# Patient Record
Sex: FEMALE | Race: WHITE | Employment: FULL TIME | URBAN - NONMETROPOLITAN AREA
[De-identification: names, ages, dates, MRNs, and addresses within clinical notes are randomized per-mention and may not be internally consistent; named-entity substitution may affect disease eponyms.]

---

## 2022-01-21 ENCOUNTER — OFFICE VISIT (OUTPATIENT)
Dept: INTERNAL MEDICINE CLINIC | Age: 27
End: 2022-01-21
Payer: COMMERCIAL

## 2022-01-21 VITALS
RESPIRATION RATE: 18 BRPM | DIASTOLIC BLOOD PRESSURE: 78 MMHG | OXYGEN SATURATION: 98 % | SYSTOLIC BLOOD PRESSURE: 120 MMHG | HEIGHT: 69 IN | HEART RATE: 76 BPM

## 2022-01-21 DIAGNOSIS — E07.9 THYROID DISORDER: ICD-10-CM

## 2022-01-21 DIAGNOSIS — F32.A ANXIETY AND DEPRESSION: Primary | ICD-10-CM

## 2022-01-21 DIAGNOSIS — F41.9 ANXIETY AND DEPRESSION: Primary | ICD-10-CM

## 2022-01-21 DIAGNOSIS — E78.5 DYSLIPIDEMIA: ICD-10-CM

## 2022-01-21 DIAGNOSIS — L20.82 FLEXURAL ECZEMA: ICD-10-CM

## 2022-01-21 DIAGNOSIS — E55.9 VITAMIN D DEFICIENCY: ICD-10-CM

## 2022-01-21 PROCEDURE — 99204 OFFICE O/P NEW MOD 45 MIN: CPT | Performed by: INTERNAL MEDICINE

## 2022-01-21 RX ORDER — SERTRALINE HYDROCHLORIDE 25 MG/1
25 TABLET, FILM COATED ORAL DAILY
Qty: 30 TABLET | Refills: 0 | Status: SHIPPED | OUTPATIENT
Start: 2022-01-21 | End: 2022-02-22

## 2022-01-21 SDOH — ECONOMIC STABILITY: FOOD INSECURITY: WITHIN THE PAST 12 MONTHS, THE FOOD YOU BOUGHT JUST DIDN'T LAST AND YOU DIDN'T HAVE MONEY TO GET MORE.: NEVER TRUE

## 2022-01-21 SDOH — ECONOMIC STABILITY: FOOD INSECURITY: WITHIN THE PAST 12 MONTHS, YOU WORRIED THAT YOUR FOOD WOULD RUN OUT BEFORE YOU GOT MONEY TO BUY MORE.: NEVER TRUE

## 2022-01-21 ASSESSMENT — PATIENT HEALTH QUESTIONNAIRE - PHQ9
SUM OF ALL RESPONSES TO PHQ QUESTIONS 1-9: 2
SUM OF ALL RESPONSES TO PHQ9 QUESTIONS 1 & 2: 2
SUM OF ALL RESPONSES TO PHQ QUESTIONS 1-9: 2
1. LITTLE INTEREST OR PLEASURE IN DOING THINGS: 1
SUM OF ALL RESPONSES TO PHQ QUESTIONS 1-9: 2
SUM OF ALL RESPONSES TO PHQ QUESTIONS 1-9: 2
2. FEELING DOWN, DEPRESSED OR HOPELESS: 1

## 2022-01-21 ASSESSMENT — SOCIAL DETERMINANTS OF HEALTH (SDOH): HOW HARD IS IT FOR YOU TO PAY FOR THE VERY BASICS LIKE FOOD, HOUSING, MEDICAL CARE, AND HEATING?: NOT HARD AT ALL

## 2022-01-21 NOTE — PROGRESS NOTES
1/21/22    Davidson Haro  1995    Chief Complaint   Patient presents with   [facility] Established New Doctor       History of Present Illness:  Davidson Haro is a 27 y.o. pleasant lady presenting today to establish care as a new patient with a chief complaint of anxiety. She has a past medical history significant for:  RICHARD, s/p iron infusions   Never smoker     # Patient has been having anxiety for years but recently over the past few months it's getting worse. Has had panic attacks in the past.   Feels anxious approximately twice weekly. Has low motivation and low energy most of the time as well. Has not sought help in the past.   Has some trouble sleeping. Interested in meds and therapy.        Health maintenance:   Health Maintenance Due   Topic Date Due    Hepatitis C screen  Never done    Varicella vaccine (1 of 2 - 2-dose childhood series) Never done    COVID-19 Vaccine (1) Never done    Depression Monitoring  Never done    HIV screen  Never done    Chlamydia screen  Never done    HPV vaccine (2 - 3-dose series) 09/07/2016    Pap smear  Never done    Flu vaccine (1) Never done         Review of Systems:  Constitutional: no fevers, no chills, no night sweats, no weight loss, no weight gain, no fatigue   Pain assessment: no pain  Head: no headaches  Ears: no hearing loss, no tinnitus, no vertigo  Eyes: no blurry vision, no diplopia, no dryness, no itchiness  Mouth: no oral ulcers, no dry mouth, no sore throat  Nose: no nasal congestion, no epistaxis  Cardiac: no chest pain, no palpitations, no leg swelling, no orthopnea, no PND, no syncope  Pulmonary: no dyspnea, no cough, no wheezing, no hemoptysis  GI: no nausea, no vomiting, no diarrhea, no constipation, no abdominal pain, no hematochezia  : no dysuria, no frequency, no urgency, no hematuria, no frothy urine, no dyspareunia, no pelvic pain, no vaginal bleeding, no abnormal vaginal discharge  MSK: no arthralgias, no myalgias, no early morning stiffness, no Raynaud's   Neuro: no focal neurological deficits, no seizures  Sleep: no snoring, no daytime somnolence   Psych: depression, anxiety, no SI/HI      Physical Exam:  VITALS:   /78 (Site: Right Upper Arm, Position: Sitting, Cuff Size: Medium Adult)   Pulse 76   Resp 18   Ht 5' 9\" (1.753 m)   SpO2 98%     PHYSICAL EXAMINATION:  General: alert, awake, and oriented to time, place, person, and situation. Not in acute distress   Skin:  Eczematous lesion on R olecranon fossa    Head: normocephalic/atraumatic  Eyes: anicteric sclera, well-injected conjunctiva. Pupils are equally round and reactive to light. Extraocular movements are intact   Nose: no septal deviation evident  Sinuses: no sinus tenderness  Ears: external ears normal  Neck: supple, no cervical lymphadenopathy, thyroid symmetric and not enlarged, no bruits   Heart: regular rate and rhythm, regular S1/S2, no S3/S4, no audible murmurs, no audible friction rub  Lungs: clear to auscultation bilaterally, no audible crackles, no audible wheezes, no audible rhonchi    Abdomen: normal bowel sounds, soft abdomen, non-tender, no palpable masses  Extremities: no edema, warm, no cyanosis, no clubbing. Good capillary refill   MSK: no tenderness across spinous processes, full ROM in all 4 extremities. No joint swelling or tenderness   Peripheral vascular: 2+ pulses symmetric (radial)  Neuro: gait normal, CN II-XII intact, motor power 5/5 in all 4 extremities, sensation intact and symmetric    Labs   I have personally reviewed labs, and discussed pertinent findings with patient on this date 1/21/2022     Imaging   I have personally reviewed imaging, and discussed pertinent findings with patient on this date 1/21/2022     Other notes  I have personally reviewed other notes, and discussed pertinent findings with patient on this date 1/21/2022       Assessment/Plan:     1.  Anxiety and depression  Start Zoloft 25mg QD  Discussed side effects  FU in 1 month   - [address] [place], [place] [facility] IM    2. Thyroid disorder  - CBC Auto Differential; Future  - COMPREHENSIVE METABOLIC PANEL; Future  - TSH with Reflex; Future    3. Vitamin D deficiency  - CBC Auto Differential; Future  - COMPREHENSIVE METABOLIC PANEL; Future  - VITAMIN D 25 HYDROXY; Future    4. Dyslipidemia  - Lipid, Fasting; Future    5. Flexural eczema  - triamcinolone (KENALOG) 0.1 % ointment; Apply topically 2 times daily for 7 days  Dispense: 80 g; Refill: 1      Care discussed with patient and questions answered. Patient verbalizes understanding and agrees with plan. Discussed with patient the importance of continuity of care. I encouraged patient to schedule next appointment within 1 month with me. Patient prefers to be reached by Phone call at [phone] for future medical correspondence. Encouraged to activate [employer]. I reviewed and reconciled the medications this visit. I reviewed and updated the past medical, surgical, social, and family history during this visit. After visit summary provided.        Dwight Maguire MD  Internal Medicine  1/21/2022   1:09 PM

## 2022-01-21 NOTE — PATIENT INSTRUCTIONS
Fasting for a blood test: taking the right steps before testing helps ensure your results will be accurate. Why do I need to fast before my blood test?  If your healthcare provider has told you to fast before a blood test, it means you should not eat or drink anything, except water, for several hours before your test. When you eat and drink normally, those foods and beverages are absorbed into your bloodstream. That could affect the results of certain types of blood tests. What types of blood tests require fasting? The most common tests that require fasting are:   Glucose tests, which measure your blood sugar.  Lipid tests, which measure cholesterol and triglycerides. You do not need to be fasting for HbA1C test.     How long do I have to fast before the test?  You usually need to fast for 8-12 hours before the test. Most tests that require fasting are scheduled for early in the morning. That way, most of your fasting time will be overnight. Can I drink anything besides water during a fast?  No. Juice, coffee, soda, and other beverages can get in your bloodstream and affect your results. In addition, you should not:   Chew gum    Smoke    Exercise  These activities can also affect your results. But you can drink water. It's actually encouraged that you drink 2 glasses of water before any blood test. It helps keep more fluid in your veins, which can make it easier to draw blood. If you are dehydrated, your blood draw experience may be unpleasant. Can I continue taking medicine during a fast?  Most of the time it's OK to take your usual medicines with water, unless otherwise specified by your healthcare provider. You may need to avoid certain medicines that you normally take with food.      What if I make a mistake and have something to eat or drink besides water during my fast?  Tell your healthcare provider before your test. Your test will most likely have to be re-scheduled for another time when you are able to complete your fast.    When can I eat and drink normally again? As soon as your test is over. You may want to bring a snack with you, so you can eat right away. Is there anything else I need to know about fasting before a blood test?  Be sure to talk to your healthcare provider if you have any questions or concerns about fasting. You should talk to your provider before taking any lab test. Most tests don't require fasting or other special preparations. For others, you may need to avoid certain foods, medicines, or activities.        [facility] Internal Medicine  [phone]

## 2022-01-21 NOTE — PROGRESS NOTES
Pap within 3 years. ([facility])    Patient would like to discuss her anxiety issues. 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

## 2022-02-17 ENCOUNTER — NURSE ONLY (OUTPATIENT)
Dept: INTERNAL MEDICINE CLINIC | Age: 27
End: 2022-02-17
Payer: COMMERCIAL

## 2022-02-17 DIAGNOSIS — E78.5 DYSLIPIDEMIA: ICD-10-CM

## 2022-02-17 DIAGNOSIS — E55.9 VITAMIN D DEFICIENCY: ICD-10-CM

## 2022-02-17 DIAGNOSIS — E07.9 THYROID DISORDER: ICD-10-CM

## 2022-02-17 LAB
A/G RATIO: 2.4 (ref 1.1–2.2)
ALBUMIN SERPL-MCNC: 5.2 G/DL (ref 3.4–5)
ALP BLD-CCNC: 79 U/L (ref 40–129)
ALT SERPL-CCNC: 14 U/L (ref 10–40)
ANION GAP SERPL CALCULATED.3IONS-SCNC: 15 MMOL/L (ref 3–16)
AST SERPL-CCNC: 20 U/L (ref 15–37)
BASOPHILS ABSOLUTE: 0 K/UL (ref 0–0.2)
BASOPHILS RELATIVE PERCENT: 0.5 %
BILIRUB SERPL-MCNC: 0.4 MG/DL (ref 0–1)
BUN BLDV-MCNC: 11 MG/DL (ref 7–20)
CALCIUM SERPL-MCNC: 9.5 MG/DL (ref 8.3–10.6)
CHLORIDE BLD-SCNC: 98 MMOL/L (ref 99–110)
CHOLESTEROL, FASTING: 205 MG/DL (ref 0–199)
CO2: 24 MMOL/L (ref 21–32)
CREAT SERPL-MCNC: 0.7 MG/DL (ref 0.6–1.1)
EOSINOPHILS ABSOLUTE: 0.2 K/UL (ref 0–0.6)
EOSINOPHILS RELATIVE PERCENT: 4 %
GFR AFRICAN AMERICAN: >60
GFR NON-AFRICAN AMERICAN: >60
GLUCOSE BLD-MCNC: 73 MG/DL (ref 70–99)
HCT VFR BLD CALC: 45.7 % (ref 36–48)
HDLC SERPL-MCNC: 67 MG/DL (ref 40–60)
HEMOGLOBIN: 15.1 G/DL (ref 12–16)
LDL CHOLESTEROL CALCULATED: 121 MG/DL
LYMPHOCYTES ABSOLUTE: 1.7 K/UL (ref 1–5.1)
LYMPHOCYTES RELATIVE PERCENT: 35.1 %
MCH RBC QN AUTO: 28.9 PG (ref 26–34)
MCHC RBC AUTO-ENTMCNC: 33.2 G/DL (ref 31–36)
MCV RBC AUTO: 87.1 FL (ref 80–100)
MONOCYTES ABSOLUTE: 0.3 K/UL (ref 0–1.3)
MONOCYTES RELATIVE PERCENT: 5.8 %
NEUTROPHILS ABSOLUTE: 2.6 K/UL (ref 1.7–7.7)
NEUTROPHILS RELATIVE PERCENT: 54.6 %
PDW BLD-RTO: 13.2 % (ref 12.4–15.4)
PLATELET # BLD: 236 K/UL (ref 135–450)
PMV BLD AUTO: 9.1 FL (ref 5–10.5)
POTASSIUM SERPL-SCNC: 4.1 MMOL/L (ref 3.5–5.1)
RBC # BLD: 5.24 M/UL (ref 4–5.2)
SODIUM BLD-SCNC: 137 MMOL/L (ref 136–145)
TOTAL PROTEIN: 7.4 G/DL (ref 6.4–8.2)
TRIGLYCERIDE, FASTING: 84 MG/DL (ref 0–150)
TSH REFLEX: 2.16 UIU/ML (ref 0.27–4.2)
VITAMIN D 25-HYDROXY: 12.6 NG/ML
VLDLC SERPL CALC-MCNC: 17 MG/DL
WBC # BLD: 4.8 K/UL (ref 4–11)

## 2022-02-17 PROCEDURE — 36415 COLL VENOUS BLD VENIPUNCTURE: CPT | Performed by: INTERNAL MEDICINE

## 2022-02-22 ENCOUNTER — TELEMEDICINE (OUTPATIENT)
Dept: INTERNAL MEDICINE CLINIC | Age: 27
End: 2022-02-22
Payer: COMMERCIAL

## 2022-02-22 DIAGNOSIS — F41.9 ANXIETY AND DEPRESSION: Primary | ICD-10-CM

## 2022-02-22 DIAGNOSIS — E55.9 VITAMIN D DEFICIENCY: ICD-10-CM

## 2022-02-22 DIAGNOSIS — F32.A ANXIETY AND DEPRESSION: Primary | ICD-10-CM

## 2022-02-22 PROCEDURE — G8427 DOCREV CUR MEDS BY ELIG CLIN: HCPCS | Performed by: INTERNAL MEDICINE

## 2022-02-22 PROCEDURE — 99214 OFFICE O/P EST MOD 30 MIN: CPT | Performed by: INTERNAL MEDICINE

## 2022-02-22 RX ORDER — ERGOCALCIFEROL 1.25 MG/1
50000 CAPSULE ORAL WEEKLY
Qty: 12 CAPSULE | Refills: 1 | Status: SHIPPED | OUTPATIENT
Start: 2022-02-22 | End: 2022-06-10

## 2022-02-22 NOTE — PROGRESS NOTES
TELEHEALTH EVALUATION -- Audio/Visual (During OTXJB-28 public health emergency)      Davidson Haro  1995    Patient location: Patient July Clarke is a 27 y.o. pleasant lady evaluated via video on 2/22/22       Consent:  She and/or health care decision maker is aware that that she may receive a bill for this virtual visit service, depending on her insurance coverage, and has provided verbal consent to proceed: Yes      History of Present Illness:  Davidson Haro is a 27 y.o. pleasant lady who has requested an audio/video evaluation for the following concern(s): anxiety/depression, vitamin D deficiency. She has a past medical history significant for:  HL (, TG 84 on 2/17/2022), not on statin   Vitamin D deficiency   Depression/anxiety, on Zoloft 25mg QD  RICHARD, s/p iron infusions   Never smoker      # Patient has been having anxiety for years but recently over the past few months it was getting worse. Has had panic attacks in the past.   Feeling anxious approximately twice weekly. Has low motivation and low energy most of the time as well. Has not sought help in the past.   Has some trouble sleeping. Started Zoloft Jan 2022. Reports minimal improvement thus far. No negative side effects  Referred to Dr. Kelis Oconnell.        Health maintenance:   Health Maintenance Due   Topic Date Due    Hepatitis C screen  Never done    Varicella vaccine (1 of 2 - 2-dose childhood series) Never done    COVID-19 Vaccine (1) Never done    HIV screen  Never done    Chlamydia screen  Never done    HPV vaccine (2 - 3-dose series) 09/07/2016    Pap smear  Never done    Flu vaccine (1) Never done         Review of Systems:  Constitutional: no fevers, no chills, no night sweats, no weight loss, no weight gain, no fatigue   Pain assessment: no pain  Head: no headaches  Ears: no hearing loss, no tinnitus, no vertigo  Eyes: no blurry vision, no diplopia, no dryness, no itchiness  Mouth: no oral ulcers, no dry mouth, no sore throat  Nose: no nasal congestion, no epistaxis  Cardiac: no chest pain, no palpitations, no leg swelling, no orthopnea, no PND, no syncope  Pulmonary: no dyspnea, no cough, no wheezing, no hemoptysis  GI: no nausea, no vomiting, no diarrhea, no constipation, no abdominal pain, no hematochezia  : no dysuria, no frequency, no urgency, no hematuria, no frothy urine, no dyspareunia, no pelvic pain, no vaginal bleeding, no abnormal vaginal discharge  MSK: no arthralgias, no myalgias, no early morning stiffness, no Raynaud's   Neuro: no focal neurological deficits, no seizures  Sleep: no snoring, no daytime somnolence   Psych: depression, anxiety, no SI/HI      Physical Exam:  Due to this being a TeleHealth encounter, evaluation of the following organ systems is limited: Vitals/Constitutional/EENT/Resp/CV/GI//MSK/Neuro/Skin/Heme-Lymph-Imm. General: alert, awake, and oriented to time, place, person, and situation. Not in acute distress. Not toxic appearing. Mood appears normal   Skin: no jaundice, no rash on visible skin  Head: appears grossly normocephalic/atraumatic  Eyes: anicteric sclera, extraocular movements are intact   Oropharynx: lips are not cyanotic  Lungs: breathing appears normal, does not appear tachypneic, does not appear labored  Abdomen: abdomen does not appear to be distended  Extremities: no swelling noted in bilateral lower extremities  MSK: full ROM in all 4 extremities. No joint swelling or erythema noted   Neuro: gait normal, CN II-XII grossly intact, motor power grossly intact in all 4 extremities      Labs   I have personally reviewed labs, and discussed pertinent findings with patient on this date 2/22/2022     Imaging   I have personally reviewed imaging, and discussed pertinent findings with patient on this date 2/22/2022     Other notes  I have personally reviewed other notes, and discussed pertinent findings with patient on this date 2/22/2022       Assessment/Plan:     1. Anxiety and depression  Increase Zoloft to 50mg QD  Establish care with  [facility] (referral previously placed)     2. Vitamin D deficiency  Start vitamin D 50,000u weekly x12 weeks  Followed by 2000u QD      Care discussed with patient and questions answered. Patient verbalizes understanding and agrees with plan. Discussed with patient the importance of continuity of care. I encouraged patient to schedule next appointment within 3 months with me. I reviewed and reconciled the medications this visit. I reviewed and updated the past medical, surgical, social, and family history during this visit. Pursuant to the emergency declaration under the [facility], [facility] waiver authority and the [employer] Act, this Virtual Visit was conducted, with patient's consent, to reduce the patient's risk of exposure to COVID-19 and provide continuity of care for an established patient. Services were provided through a video synchronous discussion virtually to substitute for in-person clinic visit.       Tori Taylor MD  Internal Medicine  2/22/2022   2:18 PM

## 2022-04-21 ENCOUNTER — PATIENT MESSAGE (OUTPATIENT)
Dept: INTERNAL MEDICINE CLINIC | Age: 27
End: 2022-04-21

## 2022-04-21 DIAGNOSIS — F41.9 ANXIETY AND DEPRESSION: ICD-10-CM

## 2022-04-21 DIAGNOSIS — F32.A ANXIETY AND DEPRESSION: ICD-10-CM

## 2022-04-22 NOTE — TELEPHONE ENCOUNTER
From: Analy Khan  To: Dr. Ez Hartman  Sent: 4/21/2022 1:23 PM EDT  Subject: Prescription     Hi. I am almost out of my sertraline prescription and it has no refills left. Do I need to schedule an appointment to get that refilled ?   Thanks,   Analy Khan

## 2022-06-10 ENCOUNTER — TELEMEDICINE (OUTPATIENT)
Dept: INTERNAL MEDICINE CLINIC | Age: 27
End: 2022-06-10
Payer: COMMERCIAL

## 2022-06-10 DIAGNOSIS — E55.9 VITAMIN D DEFICIENCY: ICD-10-CM

## 2022-06-10 DIAGNOSIS — F32.A ANXIETY AND DEPRESSION: Primary | ICD-10-CM

## 2022-06-10 DIAGNOSIS — F41.9 ANXIETY AND DEPRESSION: Primary | ICD-10-CM

## 2022-06-10 PROCEDURE — G8427 DOCREV CUR MEDS BY ELIG CLIN: HCPCS | Performed by: INTERNAL MEDICINE

## 2022-06-10 PROCEDURE — 99214 OFFICE O/P EST MOD 30 MIN: CPT | Performed by: INTERNAL MEDICINE

## 2022-06-10 RX ORDER — ESCITALOPRAM OXALATE 10 MG/1
10 TABLET ORAL DAILY
Qty: 30 TABLET | Refills: 1 | Status: SHIPPED | OUTPATIENT
Start: 2022-06-10 | End: 2022-07-06 | Stop reason: SDUPTHER

## 2022-06-10 NOTE — PROGRESS NOTES
TELEHEALTH EVALUATION -- Audio/Visual (During TSYCH-79 public health emergency)      Davidson Haro  1995    Patient location: Patient Bhargav Contreras is a 24 y.o. pleasant lady evaluated via video on 6/10/22       Consent:  She and/or health care decision maker is aware that that she may receive a bill for this virtual visit service, depending on her insurance coverage, and has provided verbal consent to proceed: Yes      History of Present Illness:  Davidson Haro is a 24 y.o. pleasant lady who has requested an audio/video evaluation for the following concern(s): depression/anxiety, vitamin D def. She has a past medical history significant for:  HL (, TG 84 on 2/17/2022), not on statin   Vitamin D deficiency   Depression/anxiety, on Zoloft 50mg QD  RICHARD, s/p iron infusions   Never smoker      # Patient has been having anxiety for years but recently over the past few months it was getting worse. Has had panic attacks in the past.   Feeling anxious approximately twice weekly. Has low motivation and low energy most of the time as well. Has not sought help in the past.   Has some trouble sleeping. Started Zoloft Jan 2022. Reports minimal improvement thus far. No negative side effects. Wants to change meds. Referred to Dr. Rose Mojica. # Patient has been having pain in her R hip, worse with exercise. Progressively worsening and radiating down her knee. Pain for few months. No injury.        Health maintenance:   Health Maintenance Due   Topic Date Due    Varicella vaccine (1 of 2 - 2-dose childhood series) Never done    COVID-19 Vaccine (1) Never done    HIV screen  Never done    Chlamydia screen  Never done    Hepatitis C screen  Never done    HPV vaccine (2 - 3-dose series) 09/07/2016    Pap smear  Never done         Review of Systems:  Constitutional: no fevers, no chills, no night sweats, no weight loss, no weight gain, no fatigue   Pain assessment: R hip pain  Head: no headaches  Ears: no hearing loss, no tinnitus, no vertigo  Eyes: no blurry vision, no diplopia, no dryness, no itchiness  Mouth: no oral ulcers, no dry mouth, no sore throat  Nose: no nasal congestion, no epistaxis  Cardiac: no chest pain, no palpitations, no leg swelling, no orthopnea, no PND, no syncope  Pulmonary: no dyspnea, no cough, no wheezing, no hemoptysis  GI: no nausea, no vomiting, no diarrhea, no constipation, no abdominal pain, no hematochezia  : no dysuria, no frequency, no urgency, no hematuria, no frothy urine, no dyspareunia, no pelvic pain, no vaginal bleeding, no abnormal vaginal discharge  MSK: no Raynaud's   Neuro: no focal neurological deficits, no seizures  Sleep: no snoring, no daytime somnolence   Psych: depression, anxiety, no SI/HI      Physical Exam:  Due to this being a TeleHealth encounter, evaluation of the following organ systems is limited: Vitals/Constitutional/EENT/Resp/CV/GI//MSK/Neuro/Skin/Heme-Lymph-Imm. General: alert, awake, and oriented to time, place, person, and situation. Not in acute distress. Not toxic appearing. Mood appears normal   Skin: no jaundice, no rash on visible skin  Head: appears grossly normocephalic/atraumatic  Eyes: anicteric sclera, extraocular movements are intact   Oropharynx: lips are not cyanotic  Lungs: breathing appears normal, does not appear tachypneic, does not appear labored  Abdomen: abdomen does not appear to be distended  Extremities: no swelling noted in bilateral lower extremities  MSK: full ROM in all 4 extremities.  No joint swelling or erythema noted   Neuro: gait normal, CN II-XII grossly intact, motor power grossly intact in all 4 extremities      Labs   I have personally reviewed labs, and discussed pertinent findings with patient on this date 6/10/2022     Imaging   I have personally reviewed imaging, and discussed pertinent findings with patient on this date 6/10/2022     Other notes  I have personally reviewed other notes, and discussed pertinent findings with patient on this date 6/10/2022       Assessment/Plan:     1. Anxiety and depression  Switch Zoloft to Lexapro 10mg QD   FU in 2 months to assess response  Discussed side effects    2. Vitamin D deficiency  Cut back loading dose to maintenance of 2000u QD       Care discussed with patient and questions answered. Patient verbalizes understanding and agrees with plan. Discussed with patient the importance of continuity of care. I encouraged patient to schedule next appointment within 2 months with me. I reviewed and reconciled the medications this visit. I reviewed and updated the past medical, surgical, social, and family history during this visit. Pursuant to the emergency declaration under the [facility], [facility] waiver authority and the [employer] and [employer] Act, this Virtual Visit was conducted, with patient's consent, to reduce the patient's risk of exposure to COVID-19 and provide continuity of care for an established patient. Services were provided through a video synchronous discussion virtually to substitute for in-person clinic visit.       Annie Chiang MD  Internal Medicine  6/10/2022   1:37 PM

## 2022-07-06 ENCOUNTER — OFFICE VISIT (OUTPATIENT)
Dept: INTERNAL MEDICINE CLINIC | Age: 27
End: 2022-07-06
Payer: COMMERCIAL

## 2022-07-06 ENCOUNTER — HOSPITAL ENCOUNTER (OUTPATIENT)
Dept: GENERAL RADIOLOGY | Age: 27
Discharge: HOME | End: 2022-07-06
Payer: COMMERCIAL

## 2022-07-06 ENCOUNTER — HOSPITAL ENCOUNTER (OUTPATIENT)
Age: 27
Discharge: HOME | End: 2022-07-06
Payer: COMMERCIAL

## 2022-07-06 VITALS
RESPIRATION RATE: 18 BRPM | HEIGHT: 69 IN | DIASTOLIC BLOOD PRESSURE: 76 MMHG | WEIGHT: 171 LBS | BODY MASS INDEX: 25.33 KG/M2 | HEART RATE: 86 BPM | SYSTOLIC BLOOD PRESSURE: 118 MMHG | OXYGEN SATURATION: 99 %

## 2022-07-06 DIAGNOSIS — G89.29 CHRONIC RIGHT HIP PAIN: Primary | ICD-10-CM

## 2022-07-06 DIAGNOSIS — M25.551 CHRONIC RIGHT HIP PAIN: Primary | ICD-10-CM

## 2022-07-06 DIAGNOSIS — F32.A ANXIETY AND DEPRESSION: ICD-10-CM

## 2022-07-06 DIAGNOSIS — F41.9 ANXIETY AND DEPRESSION: ICD-10-CM

## 2022-07-06 DIAGNOSIS — G89.29 CHRONIC RIGHT HIP PAIN: ICD-10-CM

## 2022-07-06 DIAGNOSIS — M25.551 CHRONIC RIGHT HIP PAIN: ICD-10-CM

## 2022-07-06 PROCEDURE — G8427 DOCREV CUR MEDS BY ELIG CLIN: HCPCS | Performed by: INTERNAL MEDICINE

## 2022-07-06 PROCEDURE — 99214 OFFICE O/P EST MOD 30 MIN: CPT | Performed by: INTERNAL MEDICINE

## 2022-07-06 PROCEDURE — 73502 X-RAY EXAM HIP UNI 2-3 VIEWS: CPT

## 2022-07-06 PROCEDURE — 1036F TOBACCO NON-USER: CPT | Performed by: INTERNAL MEDICINE

## 2022-07-06 PROCEDURE — G8419 CALC BMI OUT NRM PARAM NOF/U: HCPCS | Performed by: INTERNAL MEDICINE

## 2022-07-06 RX ORDER — ESCITALOPRAM OXALATE 10 MG/1
10 TABLET ORAL DAILY
Qty: 30 TABLET | Refills: 2 | Status: SHIPPED | OUTPATIENT
Start: 2022-07-06

## 2022-07-06 NOTE — PROGRESS NOTES
epistaxis  Cardiac: no chest pain, no palpitations, no leg swelling, no orthopnea, no PND, no syncope  Pulmonary: no dyspnea, no cough, no wheezing, no hemoptysis  GI: no nausea, no vomiting, no diarrhea, no constipation, no abdominal pain, no hematochezia  : no dysuria, no frequency, no urgency, no hematuria, no frothy urine, no dyspareunia, no pelvic pain, no vaginal bleeding, no abnormal vaginal discharge  MSK: no Raynaud's   Neuro: no focal neurological deficits, no seizures  Sleep: no snoring, no daytime somnolence   Psych: depression, anxiety, no SI/HI      Physical Exam:  VITALS:   /76 (Site: Left Upper Arm, Position: Sitting, Cuff Size: Medium Adult)   Pulse 86   Resp 18   Ht 5' 9\" (1.753 m)   Wt 171 lb (77.6 kg)   SpO2 99%   BMI 25.25 kg/m²     PHYSICAL EXAMINATION:  General: alert, awake, and oriented to time, place, person, and situation. Not in acute distress   Skin:  no suspicious rashes, no jaundice  Head: normocephalic/atraumatic  Eyes: anicteric sclera, well-injected conjunctiva. Pupils are equally round and reactive to light. Extraocular movements are intact   Nose: no septal deviation evident  Sinuses: no sinus tenderness  Ears: external ears normal  Neck: supple, no cervical lymphadenopathy, thyroid symmetric and not enlarged, no bruits   Heart: regular rate and rhythm, regular S1/S2, no S3/S4, no audible murmurs, no audible friction rub  Lungs: clear to auscultation bilaterally, no audible crackles, no audible wheezes, no audible rhonchi    Abdomen: normal bowel sounds, soft abdomen, non-tender, no palpable masses  Extremities: no edema, warm, no cyanosis, no clubbing.  Good capillary refill   MSK: ?PLR test positive on R side, pain in R hip on internal rotation > external rotation  Peripheral vascular: 2+ pulses symmetric (radial)  Neuro: gait normal, CN II-XII intact, motor power 5/5 in all 4 extremities, sensation intact and symmetric    Labs   I have personally reviewed labs, and discussed pertinent findings with patient on this date 7/6/2022     Imaging   I have personally reviewed imaging, and discussed pertinent findings with patient on this date 7/6/2022     Other notes  I have personally reviewed other notes, and discussed pertinent findings with patient on this date 7/6/2022       Assessment/Plan:     1. Chronic right hip pain  - XR HIP RIGHT (2-3 VIEWS); Future    2. Anxiety and depression  Stable  Continue Lexapro 10mg QD      Care discussed with patient and questions answered. Patient verbalizes understanding and agrees with plan. Discussed with patient the importance of continuity of care. I encouraged patient to schedule next appointment within 3 months with me. Patient prefers to be reached by Phone call at [phone] for future medical correspondence. Encouraged to activate [employer]. I reviewed and reconciled the medications this visit. I reviewed and updated the past medical, surgical, social, and family history during this visit. After visit summary provided.        Funmi Chowdhury MD  Internal Medicine  7/6/2022   2:59 PM

## 2022-07-15 DIAGNOSIS — M25.551 CHRONIC RIGHT HIP PAIN: Primary | ICD-10-CM

## 2022-07-15 DIAGNOSIS — G89.29 CHRONIC RIGHT HIP PAIN: Primary | ICD-10-CM

## 2022-08-02 ENCOUNTER — HOSPITAL ENCOUNTER (OUTPATIENT)
Dept: PHYSICAL THERAPY | Age: 27
Setting detail: THERAPIES SERIES
Discharge: HOME | End: 2022-08-02
Payer: COMMERCIAL

## 2022-08-02 PROCEDURE — 97161 PT EVAL LOW COMPLEX 20 MIN: CPT

## 2022-08-02 PROCEDURE — 97110 THERAPEUTIC EXERCISES: CPT

## 2022-08-02 ASSESSMENT — PAIN DESCRIPTION - FREQUENCY: FREQUENCY: INTERMITTENT

## 2022-08-02 ASSESSMENT — PAIN SCALES - GENERAL: PAINLEVEL_OUTOF10: 0

## 2022-08-02 ASSESSMENT — PAIN DESCRIPTION - LOCATION: LOCATION: HIP

## 2022-08-02 ASSESSMENT — PAIN DESCRIPTION - PAIN TYPE: TYPE: CHRONIC PAIN

## 2022-08-02 ASSESSMENT — PAIN DESCRIPTION - ORIENTATION: ORIENTATION: RIGHT

## 2022-08-03 ASSESSMENT — PAIN DESCRIPTION - ORIENTATION: ORIENTATION: RIGHT

## 2022-08-03 ASSESSMENT — PAIN DESCRIPTION - PAIN TYPE: TYPE: ACUTE PAIN

## 2022-08-03 ASSESSMENT — PAIN DESCRIPTION - FREQUENCY: FREQUENCY: INTERMITTENT

## 2022-08-03 ASSESSMENT — PAIN DESCRIPTION - LOCATION: LOCATION: HIP

## 2022-08-03 ASSESSMENT — PAIN - FUNCTIONAL ASSESSMENT: PAIN_FUNCTIONAL_ASSESSMENT: PREVENTS OR INTERFERES SOME ACTIVE ACTIVITIES AND ADLS

## 2022-08-03 ASSESSMENT — PAIN SCALES - GENERAL: PAINLEVEL_OUTOF10: 0

## 2022-08-03 NOTE — FLOWSHEET NOTE
Outpatient Physical Therapy  [place]           [] Phone: [phone]   Fax: [phone]  [facility]           [x] Phone: [phone]   Fax: [phone]        Physical Therapy Daily Treatment Note  Date:  8/3/2022    Patient Name:  Sonia Romero    :  1995  MRN: [id number]  Restrictions/Precautions: No data recorded   Position Activity Restriction  Other position/activity restrictions: none  Diagnosis:   Pain in right hip [M25.551]  Other chronic pain [G89.29] Diagnosis: R hip pain  Date of Injury/Surgery:   Treatment Diagnosis:  R hip pain  Insurance/Certification information: [address]   Referring Physician:  Sree Hogan MD [facility]   PCP: Mirtha Roper MD  Next Doctor Visit:    Plan of care signed (Y/N):    Outcome Measure:   Visit# / total visits:   /6 then PN  Pain level: 0/10   Goals:     Patient goals: no hip pain  Long Term Goals  Time Frame for Long Term Goals: 6 weeks plan expires 22  patient will be indpendent with HEP    Patient will report no greater than 2/10 R hip pain with ADL/IADL            Summary of Evaluation:           Subjective:  See eval         Any changes in Ambulatory Summary Sheet?   None        Objective:  See eval   COVID screening questions were asked and patient attested that there had been no contact or symptoms        Exercises: (No more than 4 columns)   Exercise/Equipment Date 22 Date Date           WARM UP         nustep x5' stop    bike x5' stop    TABLE      L hip flexor stretch 1/2 kneeling 3 x15 sec     Bridges  DL     Clam shells RTB #1 x15 reps/ #3 x10 reps     Side ABD  trial             STANDING                                                     PROPRIOCEPTION                                    MODALITIES                      Other Therapeutic Activities/Education:        Home Exercise Program:        Manual Treatments:        Modalities:        Communication with other providers:        Assessment:  (Response towards treatment session) (Pain Rating)   Pt tolerated  treatment without any adverse reactions or complications this date. . Pt would continue to benefit from skilled therapy interventions to address remaining impairments, improve mobility and strength,  and progress toward goal completion and prepare for d/c including finalizing HEP ;  while reducing risk for re-injury or further decline.   Pain complaints after session 0/10       Plan for Next Session:        Time In / Time Out:   see rowdy        I      Timed Code/Total Treatment Minutes:  20 TE x1/40' includes eval      Next Progress Note due:        Plan of Care Interventions:  [x] Therapeutic Exercise  [] Modalities:  [x] Therapeutic Activity     [] Ultrasound  [] Estim  [] Gait Training      [] Cervical Traction [] Lumbar Traction  [x] Neuromuscular Re-education    [] Cold/hotpack [] Iontophoresis   [x] Instruction in HEP      [] Vasopneumatic   [] Dry Needling    [x] Manual Therapy               [] Aquatic Therapy              Electronically signed by:  Jeff Haney PT, 8/3/2022, 12:48 PM

## 2022-08-03 NOTE — PROGRESS NOTES
Physical Therapy: Initial Evaluation    Patient: Sandra Dean (24 y.o. female)   Examination Date:   Plan of Care Certification Period: 8/3/2022 to        :  1995 ;    Confirmed:  MRN: [id number]  CSN: [id number]   Insurance: Payor: [employer] / Plan: [employer] [address] / Product Type: *No Product type* /   Insurance ID: [id number] - (Commercial) Secondary Insurance (if applicable):    Referring Physician: Love Fenton MD [facility]   PCP: Aisha Correia MD Visits to Date/Visits Approved:   /      No Show/Cancelled Appts:   /       Medical Diagnosis: Pain in right hip [M25.551]  Other chronic pain [G89.29] R hip pain  Treatment Diagnosis: R hip pain     PERTINENT MEDICAL HISTORY   Patient Assessed for Rehabilitation Services: Yes       Medical History: Chart Reviewed: Yes   Past Medical History:   Diagnosis Date    Iron deficiency anemia      Surgical History: No past surgical history on file. Medications:   Current Outpatient Medications:     escitalopram (LEXAPRO) 10 MG tablet, Take 1 tablet by mouth daily, Disp: 30 tablet, Rfl: 2  Allergies: Latex and Milk protein      SUBJECTIVE EXAMINATION      ,           Subjective History:    Subjective: ongoing R hip pain x 6 months- related to working out with free weightd -possibly squats- reports R anterior pain;   Additional Pertinent Hx (if applicable):            Learning/Language:       Pain Screening   Pain Screening  Patient Currently in Pain: Yes  Pain Assessment: 0-10  Pain Level: 0  Best Pain Level: 0  Worst Pain Level: 6  Patient's Stated Pain Goal: 0 - No pain  Pain Type: Acute pain  Pain Location: Hip  Pain Orientation: Right  Pain Frequency: Intermittent  Functional Pain Assessment: Prevents or interferes some active activities and ADLs    Functional Status         Social History:  Social History  Lives With: Family  Home Layout: One level    Occupation/Interests:  Type of Occupation: desk job  Job Duties: Sedentary, Prolonged sitting    Prior Level of Function:    Independent        Current Level of Function:  I      ADL Assistance: Independent  Homemaking Assistance: Independent  Homemaking Responsibilities: Yes  Ambulation Assistance: Independent  Transfer Assistance: Independent  Active : Yes  Mode of Transportation: Car    OBJECTIVE EXAMINATION   Restrictions:        Position Activity Restriction  Other position/activity restrictions: none    Review of Systems:  Vision: Within Functional Limits  Hearing: Within functional limits  Overall Orientation Status: Within Normal Limits  Patient affect[de-identified] Normal  Follows Commands: Within Functional Limits    VBI Screening / Lumbar Screening:        Regional Screen:         Observations:       Palpation:        Ambulation/Gait (if applicable):       Balance Screen:       Neuro Screen:    Left AROM  Right AROM                    Left PROM  Right PROM            WFL        Left Strength  Right Strength               Hip ABD/EXT @ least 4/5     Cervical Assessment             Thoracic Assessment            Lumbar Assessment           Trunk Strength      Core control 3+/5     Muscle Length/Flexibility:  L hip flexor tightness    Joint Mobility (if applicable):        Special Tests:        Balance/Gait Assessment(s) Performed: Additional Finding(s) (if applicable):           ASSESSMENT     Impression:      Body Structures, Functions, Activity Limitations Requiring Skilled Therapeutic Intervention: Increased pain, Decreased functional mobility     Statement of Medical Necessity: Physical Therapy is both indicated and medically necessary as outlined in the POC to increase the likelihood of meeting the functionally related goals stated below.      Patient's Activity Tolerance:        Patient's rehabilitation potential/prognosis is considered to be: Good    Factors which may impact rehabilitation potential include:          GOALS   Patient Goal(s): no hip pain  Short Term Goals Completed by   Goal Status                                                                   Long Term Goals Completed by 6 weeks plan expires 9/20/22 Goal Status   patient will be indpendent with HEP                                                                TREATMENT PLAN       Requires PT Follow-Up: Yes    Pt. actively involved in establishing Plan of Care and Goals: Yes  Patient/ Caregiver education and instruction:               Treatment may include any combination of the following:       Frequency / Duration:  Patient to be seen   for   weeks      Eval Complexity:    Decision Making: Low Complexity  History: PF- none  Exam: ROM/ strength  Clinical Presentation: evolving    PT Treatment Completed: Therapy Time  Individual Time In: 2018       Individual Time Out: 1700  Minutes: 40  Timed Code Treatment Minutes: 20 Minutes     Therapist Signature: Amina Rosen, PT    Date: 3/6/6089     I certify that the above Therapy Services are being furnished while the patient is under my care. I agree with the treatment plan and certify that this therapy is necessary. Physician's Signature:  ___________________________   Date:_______                                                                   Elton King MD        Physician Comments: _______________________________________________    Please sign and return to [facility] . Please fax to the location listed below.  [facility] YOU for this referral!    [address]  [address] [address]  Dept: [address]: [phone]       POC NOTE

## 2022-11-08 DIAGNOSIS — F32.A ANXIETY AND DEPRESSION: ICD-10-CM

## 2022-11-08 DIAGNOSIS — F41.9 ANXIETY AND DEPRESSION: ICD-10-CM

## 2022-11-09 DIAGNOSIS — F41.9 ANXIETY AND DEPRESSION: ICD-10-CM

## 2022-11-09 DIAGNOSIS — F32.A ANXIETY AND DEPRESSION: ICD-10-CM

## 2022-11-09 RX ORDER — ESCITALOPRAM OXALATE 10 MG/1
TABLET ORAL
Qty: 30 TABLET | Refills: 0 | Status: SHIPPED | OUTPATIENT
Start: 2022-11-09 | End: 2022-11-17

## 2022-11-09 RX ORDER — ESCITALOPRAM OXALATE 10 MG/1
TABLET ORAL
Qty: 30 TABLET | Refills: 0 | OUTPATIENT
Start: 2022-11-09

## 2022-11-17 ENCOUNTER — TELEMEDICINE (OUTPATIENT)
Dept: INTERNAL MEDICINE CLINIC | Age: 27
End: 2022-11-17
Payer: COMMERCIAL

## 2022-11-17 DIAGNOSIS — G89.29 CHRONIC RIGHT HIP PAIN: ICD-10-CM

## 2022-11-17 DIAGNOSIS — F32.A ANXIETY AND DEPRESSION: Primary | ICD-10-CM

## 2022-11-17 DIAGNOSIS — F41.9 ANXIETY AND DEPRESSION: Primary | ICD-10-CM

## 2022-11-17 DIAGNOSIS — M25.551 CHRONIC RIGHT HIP PAIN: ICD-10-CM

## 2022-11-17 PROCEDURE — 99214 OFFICE O/P EST MOD 30 MIN: CPT | Performed by: INTERNAL MEDICINE

## 2022-11-17 PROCEDURE — G8427 DOCREV CUR MEDS BY ELIG CLIN: HCPCS | Performed by: INTERNAL MEDICINE

## 2022-11-17 RX ORDER — ESCITALOPRAM OXALATE 20 MG/1
20 TABLET ORAL DAILY
Qty: 30 TABLET | Refills: 1 | Status: SHIPPED | OUTPATIENT
Start: 2022-11-17

## 2022-11-17 NOTE — PROGRESS NOTES
TELEHEALTH EVALUATION -- Audio/Visual (During EHLII-38 public health emergency)      Emre Fismhan  1995    Patient location: Patient Stephan Armstrong is a 24 y.o. pleasant lady evaluated via video on 11/17/22       Consent:  She and/or health care decision maker is aware that she may receive a bill for this virtual visit service, depending on her insurance coverage, and has provided verbal consent to proceed: Yes      History of Present Illness:  Emre Fishman is a 24 y.o. pleasant lady who has requested an audio/video evaluation for the following concern(s): depression/anxiety. She has a past medical history significant for:  HL (, TG 84 on 2/17/2022), not on statin   Vitamin D deficiency, on 2000u QD  Depression/anxiety, on Lexapro 10mg QD  RICHARD, s/p iron infusions   Never smoker     # Patient has been having anxiety for years. Has had panic attacks in the past. Had not sought help in the past.   Had some trouble sleeping and low motivation. Zoloft did not help. She is on Lexapro. # Patient has been having pain in her R hip, worse with exercise. Progressively worsening and radiating down her knee and sometimes to her lower back. Pain for few months. No recent injury. Has tried stretching, heating pad. Ibuprofen has not helped. Reports when she was 16 she underwent PT for ?muscle shortening. Referred to PT recently. Started new job and has difficult hrours.     [facility] and [address] [place] [address] maintenance:   Health Maintenance Due   Topic Date Due    COVID-19 Vaccine (1) Never done    Varicella vaccine (1 of 2 - 2-dose childhood series) Never done    HIV screen  Never done    Hepatitis C screen  Never done    HPV vaccine (2 - 3-dose series) 09/07/2016    Pap smear  Never done    Flu vaccine (1) Never done         Review of Systems:  Constitutional: no fevers, no chills   Pain assessment: R hip pain  Head: no headaches  Ears: no hearing loss, no tinnitus, no vertigo  Eyes: no blurry vision, no diplopia, no dryness, no itchiness  Mouth: no oral ulcers, no dry mouth, no sore throat  Nose: no nasal congestion, no epistaxis  Cardiac: no chest pain, no palpitations, no leg swelling, no orthopnea, no PND, no syncope  Pulmonary: no dyspnea, no cough, no wheezing, no hemoptysis  GI: no nausea, no vomiting, no diarrhea, no constipation, no abdominal pain, no hematochezia  : no dysuria, no frequency, no urgency, no hematuria, no frothy urine, no dyspareunia, no pelvic pain, no vaginal bleeding, no abnormal vaginal discharge  MSK: no Raynaud's   Neuro: no focal neurological deficits, no seizures  Sleep: no snoring, no daytime somnolence   Psych: depression, anxiety, no SI/HI      Physical Exam:  Due to this being a TeleHealth encounter, evaluation of the following organ systems is limited: Vitals/Constitutional/EENT/Resp/CV/GI//MSK/Neuro/Skin/Heme-Lymph-Imm. General: alert, awake, and oriented to time, place, person, and situation. Not in acute distress. Not toxic appearing. Mood appears normal   Skin: no jaundice, no rash on visible skin  Head: appears grossly normocephalic/atraumatic  Eyes: anicteric sclera, extraocular movements are intact   Oropharynx: lips are not cyanotic  Lungs: breathing appears normal, does not appear tachypneic, does not appear labored  Abdomen: abdomen does not appear to be distended  Extremities: no swelling noted in bilateral lower extremities  MSK: full ROM in all 4 extremities.  No joint swelling or erythema noted   Neuro: gait normal, CN II-XII grossly intact, motor power grossly intact in all 4 extremities      Labs   I have personally reviewed labs, and discussed pertinent findings with patient on this date 11/17/2022     Imaging   I have personally reviewed imaging, and discussed pertinent findings with patient on this date 11/17/2022     Other notes  I have personally reviewed other notes, and discussed pertinent findings with patient on this date 11/17/2022       Assessment/Plan:     1. Anxiety and depression  Increase Lexapro to 20mg QD   FU in 2 months    2. Chronic right hip pain  Continue with plan for PT       Care discussed with patient and questions answered. Patient verbalizes understanding and agrees with plan. Discussed with patient the importance of continuity of care. I encouraged patient to schedule next appointment within 2 months with me. I reviewed and reconciled the medications this visit. I reviewed and updated the past medical, surgical, social, and family history during this visit. Pursuant to the emergency declaration under the [address] [place] [address], Psychiatric hospital waiver authority and the [employer] Act, this Virtual Visit was conducted, with patient's consent, to reduce the patient's risk of exposure to COVID-19 and provide continuity of care for an established patient. Services were provided through a video synchronous discussion virtually to substitute for in-person clinic visit.       Cristal Narayan MD, MPH   Internal Medicine  11/17/2022   10:21 AM

## 2023-02-02 DIAGNOSIS — F41.9 ANXIETY AND DEPRESSION: ICD-10-CM

## 2023-02-02 DIAGNOSIS — F32.A ANXIETY AND DEPRESSION: ICD-10-CM

## 2023-02-02 RX ORDER — ESCITALOPRAM OXALATE 10 MG/1
TABLET ORAL
Qty: 30 TABLET | Refills: 0 | OUTPATIENT
Start: 2023-02-02

## 2023-03-02 ENCOUNTER — TELEPHONE (OUTPATIENT)
Dept: INTERNAL MEDICINE CLINIC | Age: 28
End: 2023-03-02

## 2023-03-02 NOTE — TELEPHONE ENCOUNTER
----- Message from Berhane King sent at 3/2/2023 12:14 PM EST -----  Subject: Medication Problem    Medication: escitalopram (LEXAPRO) 20 MG tablet  Dosage: 20mg  Ordering Provider: Opal Barksdale    Question/Problem: Patient states that when she went from 10mg to 20 mg her   anxiety has heightened       Pharmacy: [address]   [phone] Clara Jones [phone]    ---------------------------------------------------------------------------  --------------  Soriano DAVIDsTEA [facility]  [phone]; OK to leave message on voicemail  ---------------------------------------------------------------------------  --------------    SCRIPT ANSWERS  Relationship to Patient: Self  Are you having trouble breathing: No  Do you have swelling of your face, tongue, or anywhere: No  (Is the patient requesting to be seen urgently for their symptoms?): No  (If answered No to all RN Triage questions send Message to Provider):  Yes

## 2023-03-04 DIAGNOSIS — F32.A ANXIETY AND DEPRESSION: ICD-10-CM

## 2023-03-04 DIAGNOSIS — F41.9 ANXIETY AND DEPRESSION: ICD-10-CM

## 2023-03-06 ENCOUNTER — OFFICE VISIT (OUTPATIENT)
Dept: INTERNAL MEDICINE CLINIC | Age: 28
End: 2023-03-06
Payer: COMMERCIAL

## 2023-03-06 VITALS
RESPIRATION RATE: 18 BRPM | DIASTOLIC BLOOD PRESSURE: 70 MMHG | HEIGHT: 69 IN | HEART RATE: 68 BPM | SYSTOLIC BLOOD PRESSURE: 110 MMHG | OXYGEN SATURATION: 100 % | BODY MASS INDEX: 26.66 KG/M2 | WEIGHT: 180 LBS

## 2023-03-06 DIAGNOSIS — E78.5 DYSLIPIDEMIA: ICD-10-CM

## 2023-03-06 DIAGNOSIS — F41.9 ANXIETY AND DEPRESSION: Primary | ICD-10-CM

## 2023-03-06 DIAGNOSIS — Z86.39 HISTORY OF IRON DEFICIENCY: ICD-10-CM

## 2023-03-06 DIAGNOSIS — E55.9 VITAMIN D DEFICIENCY: ICD-10-CM

## 2023-03-06 DIAGNOSIS — F32.A ANXIETY AND DEPRESSION: Primary | ICD-10-CM

## 2023-03-06 PROCEDURE — 99214 OFFICE O/P EST MOD 30 MIN: CPT | Performed by: INTERNAL MEDICINE

## 2023-03-06 PROCEDURE — G8419 CALC BMI OUT NRM PARAM NOF/U: HCPCS | Performed by: INTERNAL MEDICINE

## 2023-03-06 PROCEDURE — G8484 FLU IMMUNIZE NO ADMIN: HCPCS | Performed by: INTERNAL MEDICINE

## 2023-03-06 PROCEDURE — 1036F TOBACCO NON-USER: CPT | Performed by: INTERNAL MEDICINE

## 2023-03-06 PROCEDURE — G8427 DOCREV CUR MEDS BY ELIG CLIN: HCPCS | Performed by: INTERNAL MEDICINE

## 2023-03-06 RX ORDER — VENLAFAXINE HYDROCHLORIDE 37.5 MG/1
37.5 CAPSULE, EXTENDED RELEASE ORAL EVERY EVENING
Qty: 30 CAPSULE | Refills: 1 | Status: SHIPPED | OUTPATIENT
Start: 2023-03-06

## 2023-03-06 RX ORDER — ESCITALOPRAM OXALATE 10 MG/1
TABLET ORAL
Qty: 30 TABLET | Refills: 0 | OUTPATIENT
Start: 2023-03-06

## 2023-03-06 SDOH — ECONOMIC STABILITY: FOOD INSECURITY: WITHIN THE PAST 12 MONTHS, THE FOOD YOU BOUGHT JUST DIDN'T LAST AND YOU DIDN'T HAVE MONEY TO GET MORE.: NEVER TRUE

## 2023-03-06 SDOH — ECONOMIC STABILITY: HOUSING INSECURITY
IN THE LAST 12 MONTHS, WAS THERE A TIME WHEN YOU DID NOT HAVE A STEADY PLACE TO SLEEP OR SLEPT IN A SHELTER (INCLUDING NOW)?: NO

## 2023-03-06 SDOH — ECONOMIC STABILITY: INCOME INSECURITY: HOW HARD IS IT FOR YOU TO PAY FOR THE VERY BASICS LIKE FOOD, HOUSING, MEDICAL CARE, AND HEATING?: NOT HARD AT ALL

## 2023-03-06 SDOH — ECONOMIC STABILITY: FOOD INSECURITY: WITHIN THE PAST 12 MONTHS, YOU WORRIED THAT YOUR FOOD WOULD RUN OUT BEFORE YOU GOT MONEY TO BUY MORE.: NEVER TRUE

## 2023-03-06 ASSESSMENT — PATIENT HEALTH QUESTIONNAIRE - PHQ9
4. FEELING TIRED OR HAVING LITTLE ENERGY: 3
8. MOVING OR SPEAKING SO SLOWLY THAT OTHER PEOPLE COULD HAVE NOTICED. OR THE OPPOSITE, BEING SO FIGETY OR RESTLESS THAT YOU HAVE BEEN MOVING AROUND A LOT MORE THAN USUAL: 2
SUM OF ALL RESPONSES TO PHQ QUESTIONS 1-9: 16
2. FEELING DOWN, DEPRESSED OR HOPELESS: 1
1. LITTLE INTEREST OR PLEASURE IN DOING THINGS: 1
6. FEELING BAD ABOUT YOURSELF - OR THAT YOU ARE A FAILURE OR HAVE LET YOURSELF OR YOUR FAMILY DOWN: 0
SUM OF ALL RESPONSES TO PHQ QUESTIONS 1-9: 16
SUM OF ALL RESPONSES TO PHQ QUESTIONS 1-9: 16
5. POOR APPETITE OR OVEREATING: 3
SUM OF ALL RESPONSES TO PHQ9 QUESTIONS 1 & 2: 2
9. THOUGHTS THAT YOU WOULD BE BETTER OFF DEAD, OR OF HURTING YOURSELF: 0
SUM OF ALL RESPONSES TO PHQ QUESTIONS 1-9: 16
10. IF YOU CHECKED OFF ANY PROBLEMS, HOW DIFFICULT HAVE THESE PROBLEMS MADE IT FOR YOU TO DO YOUR WORK, TAKE CARE OF THINGS AT HOME, OR GET ALONG WITH OTHER PEOPLE: 2
7. TROUBLE CONCENTRATING ON THINGS, SUCH AS READING THE NEWSPAPER OR WATCHING TELEVISION: 3
3. TROUBLE FALLING OR STAYING ASLEEP: 3

## 2023-03-06 NOTE — PATIENT INSTRUCTIONS
Fasting for a blood test: taking the right steps before testing helps ensure your results will be accurate. Why do I need to fast before my blood test?  If your healthcare provider has told you to fast before a blood test, it means you should not eat or drink anything, except water, for several hours before your test. When you eat and drink normally, those foods and beverages are absorbed into your bloodstream. That could affect the results of certain types of blood tests. What types of blood tests require fasting? The most common tests that require fasting are:  Glucose tests, which measure your blood sugar. Lipid tests, which measure cholesterol and triglycerides. You do not need to be fasting for HbA1C test.     How long do I have to fast before the test?  You usually need to fast for 8-12 hours before the test. Most tests that require fasting are scheduled for early in the morning. That way, most of your fasting time will be overnight. Can I drink anything besides water during a fast?  No. Juice, coffee, soda, and other beverages can get in your bloodstream and affect your results. In addition, you should not:  Chew gum   Smoke   Exercise  These activities can also affect your results. But you can drink water. It's actually encouraged that you drink 2 glasses of water before any blood test. It helps keep more fluid in your veins, which can make it easier to draw blood. If you are dehydrated, your blood draw experience may be unpleasant. Can I continue taking medicine during a fast?  Most of the time it's OK to take your usual medicines with water, unless otherwise specified by your healthcare provider. You may need to avoid certain medicines that you normally take with food.      What if I make a mistake and have something to eat or drink besides water during my fast?  Tell your healthcare provider before your test. Your test will most likely have to be re-scheduled for another time when you are able to complete your fast.    When can I eat and drink normally again? As soon as your test is over. You may want to bring a snack with you, so you can eat right away. Is there anything else I need to know about fasting before a blood test?  Be sure to talk to your healthcare provider if you have any questions or concerns about fasting. You should talk to your provider before taking any lab test. Most tests don't require fasting or other special preparations. For others, you may need to avoid certain foods, medicines, or activities.        [facility], located within [facility]  [phone]

## 2023-03-06 NOTE — PROGRESS NOTES
3/6/23    Davidson Haro  1995    Chief Complaint   Patient presents with    Depression       History of Present Illness:  Davidson Haro is a 24 y.o. pleasant lady presenting today with a chief complaint of depression/anxiety. She has a past medical history significant for:  HL (, TG 84 on 2/17/2022), not on statin   Vitamin D deficiency, off 2000u QD  Depression/anxiety, off Lexapro   RICHARD, s/p iron infusions   Never smoker      # Patient has been having anxiety for years. Has had panic attacks in the past. Had not sought help in the past.   Had some trouble sleeping and low motivation. Zoloft did not help. Did not tolerate Lexapro. # Patient has been having pain in her R hip, worse with exercise. Progressively worsening and radiating down her knee and sometimes to her lower back. Pain for few months. No recent injury. Has tried stretching, heating pad. Ibuprofen has not helped. Reports when she was 16 she underwent PT for ?muscle shortening. Referred to PT recently. Started new job and has difficult hrours.      [facility] and [address] [place] [address] maintenance:   Health Maintenance Due   Topic Date Due    COVID-19 Vaccine (1) Never done    Varicella vaccine (1 of 2 - 2-dose childhood series) Never done    HIV screen  Never done    Hepatitis C screen  Never done    HPV vaccine (2 - 3-dose series) 09/07/2016    Pap smear  Never done    Flu vaccine (1) Never done    Depression Monitoring  01/21/2023         Review of Systems:  Constitutional: no fevers, no chills   Pain assessment: R hip pain  Head: no headaches  Ears: no hearing loss, no tinnitus, no vertigo  Eyes: no blurry vision, no diplopia, no dryness, no itchiness  Mouth: no oral ulcers, no dry mouth, no sore throat  Nose: no nasal congestion, no epistaxis  Cardiac: no chest pain, no palpitations, no leg swelling, no orthopnea, no PND, no syncope  Pulmonary: no dyspnea, no cough, no wheezing, no hemoptysis  GI: no nausea, no vomiting, no diarrhea, no constipation, no abdominal pain, no hematochezia  : no dysuria, no frequency, no urgency, no hematuria, no frothy urine, no dyspareunia, no pelvic pain, no vaginal bleeding, no abnormal vaginal discharge  MSK: no Raynaud's   Neuro: no focal neurological deficits, no seizures  Sleep: no snoring, no daytime somnolence   Psych: depression, anxiety, no SI/HI      Physical Exam:  VITALS:   /70 (Site: Right Upper Arm, Position: Sitting, Cuff Size: Large Adult)   Pulse 68   Resp 18   Ht 5' 9\" (1.753 m)   Wt 180 lb (81.6 kg)   SpO2 100%   BMI 26.58 kg/m²     PHYSICAL EXAMINATION:  General: alert, awake, and oriented to time, place, person, and situation. Not in acute distress   Skin:  no suspicious rashes, no jaundice  Head: normocephalic/atraumatic  Eyes: anicteric sclera, well-injected conjunctiva. Pupils are equally round and reactive to light. Extraocular movements are intact   Nose: no septal deviation evident  Sinuses: no sinus tenderness  Ears: external ears normal  Neck: supple, no cervical lymphadenopathy, thyroid symmetric and not enlarged, no bruits   Heart: regular rate and rhythm, regular S1/S2, no S3/S4, no audible murmurs, no audible friction rub  Lungs: clear to auscultation bilaterally, no audible crackles, no audible wheezes, no audible rhonchi    Abdomen: normal bowel sounds, soft abdomen, non-tender, no palpable masses  Extremities: no edema, warm, no cyanosis, no clubbing. Good capillary refill   MSK: no tenderness across spinous processes, full ROM in all 4 extremities.  No joint swelling or tenderness   Peripheral vascular: 2+ pulses symmetric (radial)  Neuro: gait normal, CN II-XII intact, motor power 5/5 in all 4 extremities, sensation intact and symmetric    Labs   I have personally reviewed labs, and discussed pertinent findings with patient on this date 3/6/2023     Imaging   I have personally reviewed imaging, and discussed pertinent findings with patient on this date 3/6/2023     Other notes  I have personally reviewed other notes, and discussed pertinent findings with patient on this date 3/6/2023       Assessment/Plan:     1. Anxiety and depression  Failed 2 SSRIs  Start Venlafaxine-XR 37.5mg QD and will remain on low dose for at least 6 weeks. Discussed side effects. Close FU     2. Vitamin D deficiency  Off supplementation  - Vitamin D 25 Hydroxy; Future    3. History of iron deficiency  S/p iron infusions  Off supplementation   - CBC with Auto Differential; Future  - Ferritin; Future  - Iron and TIBC; Future    4. Dyslipidemia  , TG 84 Feb 2022  Not on statin  - Comprehensive Metabolic Panel; Future  - Lipid, Fasting; Future      Care discussed with patient and questions answered. Patient verbalizes understanding and agrees with plan. Discussed with patient the importance of continuity of care. I encouraged patient to schedule next appointment within 6 weeks with me. Patient prefers to be reached by Phone call at [phone] for future medical correspondence. Encouraged to activate [place]. I reviewed and reconciled the medications this visit. I reviewed and updated the past medical, surgical, social, and family history during this visit. After visit summary provided.        Funmi Chowdhury MD, MPH   Internal Medicine  3/6/2023   8:32 AM

## 2023-04-21 ENCOUNTER — OFFICE VISIT (OUTPATIENT)
Dept: INTERNAL MEDICINE CLINIC | Age: 28
End: 2023-04-21
Payer: COMMERCIAL

## 2023-04-21 ENCOUNTER — HOSPITAL ENCOUNTER (OUTPATIENT)
Age: 28
Discharge: HOME | End: 2023-04-21
Payer: COMMERCIAL

## 2023-04-21 VITALS
RESPIRATION RATE: 18 BRPM | SYSTOLIC BLOOD PRESSURE: 110 MMHG | DIASTOLIC BLOOD PRESSURE: 76 MMHG | HEIGHT: 69 IN | OXYGEN SATURATION: 99 % | HEART RATE: 78 BPM | BODY MASS INDEX: 26.66 KG/M2 | WEIGHT: 180 LBS

## 2023-04-21 DIAGNOSIS — E78.5 DYSLIPIDEMIA: ICD-10-CM

## 2023-04-21 DIAGNOSIS — E55.9 VITAMIN D DEFICIENCY: ICD-10-CM

## 2023-04-21 DIAGNOSIS — F32.A ANXIETY AND DEPRESSION: Primary | ICD-10-CM

## 2023-04-21 DIAGNOSIS — F41.9 ANXIETY AND DEPRESSION: Primary | ICD-10-CM

## 2023-04-21 DIAGNOSIS — Z86.39 HISTORY OF IRON DEFICIENCY: ICD-10-CM

## 2023-04-21 LAB
25(OH)D3 SERPL-MCNC: 17.6 NG/ML
ALBUMIN SERPL-MCNC: 4.7 GM/DL (ref 3.4–5)
ALP BLD-CCNC: 72 IU/L (ref 40–129)
ALT SERPL-CCNC: 13 U/L (ref 10–40)
ANION GAP SERPL CALCULATED.3IONS-SCNC: 13 MMOL/L (ref 4–16)
AST SERPL-CCNC: 20 IU/L (ref 15–37)
BASOPHILS ABSOLUTE: 0 K/CU MM
BASOPHILS RELATIVE PERCENT: 0.3 % (ref 0–1)
BILIRUB SERPL-MCNC: 0.5 MG/DL (ref 0–1)
BUN SERPL-MCNC: 10 MG/DL (ref 6–23)
CALCIUM SERPL-MCNC: 9.8 MG/DL (ref 8.3–10.6)
CHLORIDE BLD-SCNC: 101 MMOL/L (ref 99–110)
CHOLESTEROL, FASTING: 185 MG/DL
CO2: 25 MMOL/L (ref 21–32)
CREAT SERPL-MCNC: 0.6 MG/DL (ref 0.6–1.1)
DIFFERENTIAL TYPE: ABNORMAL
EOSINOPHILS ABSOLUTE: 0.1 K/CU MM
EOSINOPHILS RELATIVE PERCENT: 1.5 % (ref 0–3)
FERRITIN: 51 NG/ML (ref 15–150)
GFR SERPL CREATININE-BSD FRML MDRD: >60 ML/MIN/1.73M2
GLUCOSE SERPL-MCNC: 84 MG/DL (ref 70–99)
HCT VFR BLD CALC: 41.3 % (ref 37–47)
HDLC SERPL-MCNC: 59 MG/DL
HEMOGLOBIN: 13.4 GM/DL (ref 12.5–16)
IMMATURE NEUTROPHIL %: 0.2 % (ref 0–0.43)
IRON: 108 UG/DL (ref 37–145)
LDLC SERPL CALC-MCNC: 115 MG/DL
LYMPHOCYTES ABSOLUTE: 2 K/CU MM
LYMPHOCYTES RELATIVE PERCENT: 32.7 % (ref 24–44)
MCH RBC QN AUTO: 28.3 PG (ref 27–31)
MCHC RBC AUTO-ENTMCNC: 32.4 % (ref 32–36)
MCV RBC AUTO: 87.1 FL (ref 78–100)
MONOCYTES ABSOLUTE: 0.4 K/CU MM
MONOCYTES RELATIVE PERCENT: 7 % (ref 0–4)
PCT TRANSFERRIN: 32 % (ref 10–44)
PDW BLD-RTO: 12.3 % (ref 11.7–14.9)
PLATELET # BLD: 208 K/CU MM (ref 140–440)
PMV BLD AUTO: 10.1 FL (ref 7.5–11.1)
POTASSIUM SERPL-SCNC: 4.1 MMOL/L (ref 3.5–5.1)
RBC # BLD: 4.74 M/CU MM (ref 4.2–5.4)
SEGMENTED NEUTROPHILS ABSOLUTE COUNT: 3.5 K/CU MM
SEGMENTED NEUTROPHILS RELATIVE PERCENT: 58.3 % (ref 36–66)
SODIUM BLD-SCNC: 139 MMOL/L (ref 135–145)
TOTAL IMMATURE NEUTOROPHIL: 0.01 K/CU MM
TOTAL IRON BINDING CAPACITY: 333 UG/DL (ref 250–450)
TOTAL PROTEIN: 7.3 GM/DL (ref 6.4–8.2)
TRIGLYCERIDE, FASTING: 53 MG/DL
UNSATURATED IRON BINDING CAPACITY: 225 UG/DL (ref 110–370)
WBC # BLD: 6 K/CU MM (ref 4–10.5)

## 2023-04-21 PROCEDURE — 82306 VITAMIN D 25 HYDROXY: CPT

## 2023-04-21 PROCEDURE — 83540 ASSAY OF IRON: CPT

## 2023-04-21 PROCEDURE — G8427 DOCREV CUR MEDS BY ELIG CLIN: HCPCS | Performed by: INTERNAL MEDICINE

## 2023-04-21 PROCEDURE — 80061 LIPID PANEL: CPT

## 2023-04-21 PROCEDURE — 80053 COMPREHEN METABOLIC PANEL: CPT

## 2023-04-21 PROCEDURE — G8419 CALC BMI OUT NRM PARAM NOF/U: HCPCS | Performed by: INTERNAL MEDICINE

## 2023-04-21 PROCEDURE — 82728 ASSAY OF FERRITIN: CPT

## 2023-04-21 PROCEDURE — 1036F TOBACCO NON-USER: CPT | Performed by: INTERNAL MEDICINE

## 2023-04-21 PROCEDURE — 99213 OFFICE O/P EST LOW 20 MIN: CPT | Performed by: INTERNAL MEDICINE

## 2023-04-21 PROCEDURE — 36415 COLL VENOUS BLD VENIPUNCTURE: CPT

## 2023-04-21 PROCEDURE — 83550 IRON BINDING TEST: CPT

## 2023-04-21 PROCEDURE — 85025 COMPLETE CBC W/AUTO DIFF WBC: CPT

## 2023-04-21 RX ORDER — VENLAFAXINE HYDROCHLORIDE 37.5 MG/1
37.5 CAPSULE, EXTENDED RELEASE ORAL EVERY EVENING
Qty: 90 CAPSULE | Refills: 1 | Status: SHIPPED | OUTPATIENT
Start: 2023-04-21

## 2023-04-21 NOTE — PROGRESS NOTES
4/21/23    Davidson Haro  1995    Chief Complaint   Patient presents with    Other     6wk fu        History of Present Illness:  Davidson Haro is a 24 y.o. pleasant lady presenting today with a chief complaint of depression/anxiety. She has a past medical history significant for:  HL (, TG 84 on 2/17/2022), not on statin   Vitamin D deficiency, off 2000u QD  Depression/anxiety, on Venlafaxine-XR 37.5mg QD   RICHARD, s/p iron infusions   Never smoker      # Patient has been having anxiety for years. Has had panic attacks in the past. Had not sought help in the past.   Had some trouble sleeping and low motivation. Zoloft did not help. Did not tolerate Lexapro. Trial Effexor has started to help (at low dose). # Patient has been having pain in her R hip, worse with exercise. Progressively worsening and radiating down her knee and sometimes to her lower back. Pain for few months. No recent injury. Has tried stretching, heating pad. Ibuprofen has not helped. Reports when she was 16 she underwent PT for ?muscle shortening. Referred to PT recently. Started new job and has difficult hrours.      [facility] and [address] [place] [address] maintenance:   Health Maintenance Due   Topic Date Due    COVID-19 Vaccine (1) Never done    Varicella vaccine (1 of 2 - 2-dose childhood series) Never done    HIV screen  Never done    Hepatitis C screen  Never done    HPV vaccine (2 - 3-dose series) 09/07/2016    Pap smear  Never done         Review of Systems:  Constitutional: no fevers, no chills   Pain assessment: R hip pain  Head: no headaches  Ears: no hearing loss, no tinnitus, no vertigo  Eyes: no blurry vision, no diplopia, no dryness, no itchiness  Mouth: no oral ulcers, no dry mouth, no sore throat  Nose: no nasal congestion, no epistaxis  Cardiac: no chest pain, no palpitations, no leg swelling, no orthopnea, no PND, no syncope  Pulmonary: no dyspnea, no cough, no wheezing, no hemoptysis  GI: no

## 2023-04-24 RX ORDER — ERGOCALCIFEROL 1.25 MG/1
50000 CAPSULE ORAL WEEKLY
Qty: 12 CAPSULE | Refills: 0 | Status: SHIPPED | OUTPATIENT
Start: 2023-04-24

## 2023-12-08 DIAGNOSIS — F41.9 ANXIETY AND DEPRESSION: ICD-10-CM

## 2023-12-08 DIAGNOSIS — F32.A ANXIETY AND DEPRESSION: ICD-10-CM

## 2023-12-08 RX ORDER — VENLAFAXINE HYDROCHLORIDE 37.5 MG/1
37.5 CAPSULE, EXTENDED RELEASE ORAL EVERY EVENING
Qty: 90 CAPSULE | Refills: 1 | Status: SHIPPED | OUTPATIENT
Start: 2023-12-08

## 2024-03-26 ENCOUNTER — OFFICE VISIT (OUTPATIENT)
Age: 29
End: 2024-03-26
Payer: COMMERCIAL

## 2024-03-26 VITALS
WEIGHT: 180 LBS | DIASTOLIC BLOOD PRESSURE: 70 MMHG | HEART RATE: 80 BPM | HEIGHT: 69 IN | SYSTOLIC BLOOD PRESSURE: 110 MMHG | RESPIRATION RATE: 17 BRPM | OXYGEN SATURATION: 98 % | BODY MASS INDEX: 26.66 KG/M2

## 2024-03-26 DIAGNOSIS — F41.9 ANXIETY AND DEPRESSION: Primary | ICD-10-CM

## 2024-03-26 DIAGNOSIS — E78.00 PURE HYPERCHOLESTEROLEMIA: ICD-10-CM

## 2024-03-26 DIAGNOSIS — E55.9 VITAMIN D DEFICIENCY: ICD-10-CM

## 2024-03-26 DIAGNOSIS — F32.A ANXIETY AND DEPRESSION: Primary | ICD-10-CM

## 2024-03-26 DIAGNOSIS — L30.8 OTHER ECZEMA: ICD-10-CM

## 2024-03-26 PROCEDURE — 99214 OFFICE O/P EST MOD 30 MIN: CPT | Performed by: GENERAL PRACTICE

## 2024-03-26 PROCEDURE — G8484 FLU IMMUNIZE NO ADMIN: HCPCS | Performed by: GENERAL PRACTICE

## 2024-03-26 PROCEDURE — G8427 DOCREV CUR MEDS BY ELIG CLIN: HCPCS | Performed by: GENERAL PRACTICE

## 2024-03-26 PROCEDURE — G8419 CALC BMI OUT NRM PARAM NOF/U: HCPCS | Performed by: GENERAL PRACTICE

## 2024-03-26 PROCEDURE — 1036F TOBACCO NON-USER: CPT | Performed by: GENERAL PRACTICE

## 2024-03-26 RX ORDER — VENLAFAXINE HYDROCHLORIDE 75 MG/1
75 CAPSULE, EXTENDED RELEASE ORAL EVERY EVENING
Qty: 90 CAPSULE | Refills: 1 | Status: SHIPPED | OUTPATIENT
Start: 2024-03-26

## 2024-03-26 RX ORDER — TRIAMCINOLONE ACETONIDE 0.25 MG/G
OINTMENT TOPICAL
Qty: 80 G | Refills: 5 | Status: SHIPPED | OUTPATIENT
Start: 2024-03-26 | End: 2024-04-02

## 2024-03-26 SDOH — ECONOMIC STABILITY: FOOD INSECURITY: WITHIN THE PAST 12 MONTHS, YOU WORRIED THAT YOUR FOOD WOULD RUN OUT BEFORE YOU GOT MONEY TO BUY MORE.: NEVER TRUE

## 2024-03-26 SDOH — ECONOMIC STABILITY: FOOD INSECURITY: WITHIN THE PAST 12 MONTHS, THE FOOD YOU BOUGHT JUST DIDN'T LAST AND YOU DIDN'T HAVE MONEY TO GET MORE.: NEVER TRUE

## 2024-03-26 SDOH — ECONOMIC STABILITY: INCOME INSECURITY: HOW HARD IS IT FOR YOU TO PAY FOR THE VERY BASICS LIKE FOOD, HOUSING, MEDICAL CARE, AND HEATING?: NOT HARD AT ALL

## 2024-03-26 ASSESSMENT — PATIENT HEALTH QUESTIONNAIRE - PHQ9
9. THOUGHTS THAT YOU WOULD BE BETTER OFF DEAD, OR OF HURTING YOURSELF: NOT AT ALL
7. TROUBLE CONCENTRATING ON THINGS, SUCH AS READING THE NEWSPAPER OR WATCHING TELEVISION: SEVERAL DAYS
10. IF YOU CHECKED OFF ANY PROBLEMS, HOW DIFFICULT HAVE THESE PROBLEMS MADE IT FOR YOU TO DO YOUR WORK, TAKE CARE OF THINGS AT HOME, OR GET ALONG WITH OTHER PEOPLE: SOMEWHAT DIFFICULT
2. FEELING DOWN, DEPRESSED OR HOPELESS: NOT AT ALL
3. TROUBLE FALLING OR STAYING ASLEEP: NEARLY EVERY DAY
6. FEELING BAD ABOUT YOURSELF - OR THAT YOU ARE A FAILURE OR HAVE LET YOURSELF OR YOUR FAMILY DOWN: NOT AT ALL
SUM OF ALL RESPONSES TO PHQ QUESTIONS 1-9: 10
5. POOR APPETITE OR OVEREATING: NEARLY EVERY DAY
SUM OF ALL RESPONSES TO PHQ QUESTIONS 1-9: 10
SUM OF ALL RESPONSES TO PHQ9 QUESTIONS 1 & 2: 0
SUM OF ALL RESPONSES TO PHQ QUESTIONS 1-9: 10
8. MOVING OR SPEAKING SO SLOWLY THAT OTHER PEOPLE COULD HAVE NOTICED. OR THE OPPOSITE, BEING SO FIGETY OR RESTLESS THAT YOU HAVE BEEN MOVING AROUND A LOT MORE THAN USUAL: NOT AT ALL
SUM OF ALL RESPONSES TO PHQ QUESTIONS 1-9: 10
1. LITTLE INTEREST OR PLEASURE IN DOING THINGS: NOT AT ALL
4. FEELING TIRED OR HAVING LITTLE ENERGY: NEARLY EVERY DAY

## 2024-03-26 ASSESSMENT — ENCOUNTER SYMPTOMS
CHEST TIGHTNESS: 0
STRIDOR: 0
BLOOD IN STOOL: 0
SHORTNESS OF BREATH: 0
ABDOMINAL PAIN: 0
COUGH: 0
BACK PAIN: 0
NAUSEA: 0
VOMITING: 0

## 2024-03-26 ASSESSMENT — COLUMBIA-SUICIDE SEVERITY RATING SCALE - C-SSRS
1. WITHIN THE PAST MONTH, HAVE YOU WISHED YOU WERE DEAD OR WISHED YOU COULD GO TO SLEEP AND NOT WAKE UP?: NO
2. HAVE YOU ACTUALLY HAD ANY THOUGHTS OF KILLING YOURSELF?: NO
6. HAVE YOU EVER DONE ANYTHING, STARTED TO DO ANYTHING, OR PREPARED TO DO ANYTHING TO END YOUR LIFE?: NO

## 2024-03-26 NOTE — PROGRESS NOTES
Garett James (:  1995) is a 28 y.o. female,Established patient, here for evaluation of the following chief complaint(s):  new to provider (Patient would like to discuss increase in effexor)          ASSESSMENT/PLAN:  1. Anxiety and depression  Check labs, increase effexor.   - Comprehensive Metabolic Panel  - TSH  - T4, Free  - venlafaxine (EFFEXOR XR) 75 MG extended release capsule; Take 1 capsule by mouth every evening  Dispense: 90 capsule; Refill: 1    2. Vitamin D deficiency  Check Vit D level  - Vitamin D 25 Hydroxy    3. Other eczema  Start topical triamcinolone BID until clears, once weekly for maintenance  - triamcinolone (KENALOG) 0.025 % ointment; Apply topically 2 times daily.  Dispense: 80 g; Refill: 5    4. Pure hypercholesterolemia  Recommend weight reduction and exercise.      Return in about 4 weeks (around 2024) for Interval follow-up virtual.         Subjective   SUBJECTIVE/OBJECTIVE:  HPI  Garett James is a 28 y.o. pleasant lady presenting today with a chief complaint of establish with new pcm.    She has a past medical history significant for:  HL (, TG 84 on 2022), not on statin   Vitamin D deficiency, off 2000u QD  Depression/anxiety, on Venlafaxine-XR 75mg QD   RICHARD, s/p iron infusions Hgb level 3.  Never smoker      # Patient has been having anxiety for years. Has had panic attacks in the past. Had some trouble sleeping and low motivation. Zoloft did not help. Did not tolerate Lexapro.   Trial Effexor has started to help. Increased anxiety, increased to 75mg daily.  # Patient has been having pain in her R hip,PT was not helpful. Conservative measures not helpful. Pain resolved.  #HL (LDL was higher but now below 120). Due for recheck.  #Eczema on hydrocortisone, not helping. Involving scalp, neck, and elbows.       UTD On vaccinations  Due for PAP with [facility]     [facility]     Review of Systems   Constitutional:  Negative for activity